# Patient Record
(demographics unavailable — no encounter records)

---

## 2024-10-08 NOTE — HISTORY OF PRESENT ILLNESS
[de-identified] : 10/8/24: 59 year old male presents for f/u for sinusitis Hx Covid 3 weeks ago Sore throat, anterior rhinorrhea, sinus pressure headache, Chronic tinnitus ongoing-amitriptyline 50 Continuing w Doxy, Amox, Prednisone Amitriptyline and Ipratropium gives him best relief, here for rx renewal. Currently rx pneumonia doxy amox loratadine pred  4/4/24:  Rx for sinusitis w doxy for sinusitis rx pred chronic tinnitus doing well w amitriptyline hx septoplasty 2011 pnd using ipratropium

## 2024-10-08 NOTE — PROCEDURE
[FreeTextEntry6] : Indication:  Unable to adequately examine nasal passages and sinus drainage with anterior rhinoscopy. The patient has hx sinusitis septoplasty  Scope # 99 Mild septal deviation is present on direct visualization on either side. Both inferior nasal turbinates are moderate in size with normal  appearing mucosa.  The sinus endoscope was introduced into the right nares exam right middle meatus reveals no mucopus, polyps or inflammation.  The middle turbinate is unremarkable. The scope was advanced and the sphenoethmoid region was inspected. The superior meatus and nasal vault are unremarkable.  The nasopharynx is unremarkable without inflammation or mass The sinus endoscope was introduced into the left nares exam of the left middle meatus reveals no mucopus, polyps or inflammation and the left middle turbinate is unremarkable. The scope was advanced and the sphenoethmoid region was inspected. The left superior meatus and nasal vault are unremarkable.

## 2024-10-08 NOTE — ASSESSMENT
[FreeTextEntry1] : reviewed ct and audio chronic loud tinnitus-continue amitriptyline ipratropium spray

## 2024-11-27 NOTE — HISTORY OF PRESENT ILLNESS
[de-identified] : The patient is a 59-year-old gentleman who presents for a follow up appointment and percentage of loss for his left and right hip. He was originally injured at work on January 24, 2020. The patient states on this date he was working for the [employer]. He was climbing a ladder to clean some speakers. Unfortunately the ladder collapsed on him falling 6 feet injuring his right hip, right hand, right wrist, his right elbow, lower back and cervical spine. He did follow-up with orthopedic practitioner Dr. Umanzor and his PA Chiki whereby a work-up revealed arthritic changes to the right hip. Since his injury on January 24, 2020, the patient was having difficulty standing for long periods of time, walking for long periods of time, negotiating stairs. He was having difficulty putting his shoes and socks on and getting of a car. The pain in the right hip is gotten significantly worse as the months have gone by. The patient had another injury on August 7, 2020 whereby working as a  in and around the [employer] department he had a twisting injury injuring his left hip. The patient had injections of cortisone into that left hip as well as exercise programs and anti-inflammatories until eventual left anterior total hip replacement that was done on December 29, 2021. He also had a Right total hip replaced after there was no relief with conservative management. Patient is currently working.  He states that he returned to work on a limited duties.  With working at a desk job at the present time.  In regards to both hips.  He states that the pain has markedly improved and is very happy with the progression following surgery.  He states that although he does have some mild discomfort at times overall he is pleased with his outcome.  And is slowly returned back to his activities.  He presents today for a percentage of loss for both the left and right hip.  He denies any new or recent injury.  There really is

## 2024-11-27 NOTE — PHYSICAL EXAM
[Antalgic] : antalgic [LE] : Sensory: Intact in bilateral lower extremities [ALL] : dorsalis pedis, posterior tibial, femoral, popliteal, and radial 2+ and symmetric bilaterally [de-identified] :  on physical examination of the left hip.  Patient is status post left total hip replacement from the anterior approach.  There is a well-healed surgical scar noted.  There is no redness, swelling, heat, discharge, ecchymosis, pain or tenderness on examination.  The patient has excellent range of motion, passively, actively and against resistance, in all planes.  There is no evidence of limb length discrepancy.  No palpable defect noted. No evidence of muscle atrophy. No evidence of any motor or sensory deficit and is neurovascularly intact.  Patient has good distal pulses with no calf tenderness.  On physical examination of the right hip.  Patient is status post right total hip replacement.  There is a well-healed surgical scar noted anteriorly.  There is no redness, swelling, heat, ecchymosis, discharge, pain or tenderness on examination.  The patient has excellent range of motion, passively, actively and against resistance, in all planes.  There is no evidence of limb length discrepancy.  No evidence of muscle atrophy. No palpable defect. No evidence of any motor or sensory deficit.  Patient has good distal pulses with no calf tenderness.  [de-identified] : X-rays of the left hip were taken in the office today. This includes 2 views. The AP pelvis and lateral hip. They reveal a status post left total hip replacement. The hardware is in excellent placement with great alignment, as well as fixation. There is no evidence of limb length discrepancies. There is no evidence of any fracture, dislocation, subsidence of the implant or loosening of the prosthesis.  X-rays of the right hip were taken in the office today. This includes 2 views. The AP pelvis and lateral hip. They reveal a status post left total hip replacement. The hardware is in perfect placement and shows excellent alignment as well as fixation. There is no evidence of limb length discrepancies. There is no evidence of any fracture, subsidence of the implant, dislocation or loosening of the prosthesis.   [Alert] : alert [Healthy Appearing] : healthy appearing [No Acute Distress] : no acute distress [Clear to Auscultation] : lungs were clear to auscultation bilaterally [Normal Rate] : normal rate [Regular Rhythm] : regular rhythm [Non-Tender] : Liver Edge: non-tender [Scleral Icterus] : no scleral icterus [Abdominal Bruit] : no abdominal bruit [Ascites Fluid Wave] : no ascites fluid wave [Ascites Tense] : no ascites tense [Clean] : unclean [Dry] : moist [Healing Well] : healing poorly [Jaundice] : no jaundice [Asterixis] : no asterixis [de-identified] : massively edematous [de-identified] : minimal fluid obese no anasarca [de-identified] : Tamar Snow incision well healed, obese [TextBox_40] : 1+ [FreeTextEntry1] : Need new labs massive fluid and weight increase, will start diuretics. RTC 1 week.

## 2024-11-27 NOTE — PHYSICAL EXAM
[Antalgic] : antalgic [LE] : Sensory: Intact in bilateral lower extremities [ALL] : dorsalis pedis, posterior tibial, femoral, popliteal, and radial 2+ and symmetric bilaterally [de-identified] :  on physical examination of the left hip.  Patient is status post left total hip replacement from the anterior approach.  There is a well-healed surgical scar noted.  There is no redness, swelling, heat, discharge, ecchymosis, pain or tenderness on examination.  The patient has excellent range of motion, passively, actively and against resistance, in all planes.  There is no evidence of limb length discrepancy.  No palpable defect noted. No evidence of muscle atrophy. No evidence of any motor or sensory deficit and is neurovascularly intact.  Patient has good distal pulses with no calf tenderness.  On physical examination of the right hip.  Patient is status post right total hip replacement.  There is a well-healed surgical scar noted anteriorly.  There is no redness, swelling, heat, ecchymosis, discharge, pain or tenderness on examination.  The patient has excellent range of motion, passively, actively and against resistance, in all planes.  There is no evidence of limb length discrepancy.  No evidence of muscle atrophy. No palpable defect. No evidence of any motor or sensory deficit.  Patient has good distal pulses with no calf tenderness.  [de-identified] : X-rays of the left hip were taken in the office today. This includes 2 views. The AP pelvis and lateral hip. They reveal a status post left total hip replacement. The hardware is in excellent placement with great alignment, as well as fixation. There is no evidence of limb length discrepancies. There is no evidence of any fracture, dislocation, subsidence of the implant or loosening of the prosthesis.  X-rays of the right hip were taken in the office today. This includes 2 views. The AP pelvis and lateral hip. They reveal a status post left total hip replacement. The hardware is in perfect placement and shows excellent alignment as well as fixation. There is no evidence of limb length discrepancies. There is no evidence of any fracture, subsidence of the implant, dislocation or loosening of the prosthesis.

## 2024-11-27 NOTE — REASON FOR VISIT
[Follow-Up Visit] : a follow-up visit for [Workers' Comp: Date of Injury: _____] : This visit is related to worker's compensation. Date of Injury: [unfilled] [FreeTextEntry2] : s/p Right THR 11/29/2023

## 2024-11-27 NOTE — HISTORY OF PRESENT ILLNESS
[de-identified] : The patient is a 59-year-old gentleman who presents for a follow up appointment and percentage of loss for his left and right hip. He was originally injured at work on January 24, 2020. The patient states on this date he was working for the [employer]. He was climbing a ladder to clean some speakers. Unfortunately the ladder collapsed on him falling 6 feet injuring his right hip, right hand, right wrist, his right elbow, lower back and cervical spine. He did follow-up with orthopedic practitioner Dr. Umanzor and his PA Chiki whereby a work-up revealed arthritic changes to the right hip. Since his injury on January 24, 2020, the patient was having difficulty standing for long periods of time, walking for long periods of time, negotiating stairs. He was having difficulty putting his shoes and socks on and getting of a car. The pain in the right hip is gotten significantly worse as the months have gone by. The patient had another injury on August 7, 2020 whereby working as a  in and around the [employer] department he had a twisting injury injuring his left hip. The patient had injections of cortisone into that left hip as well as exercise programs and anti-inflammatories until eventual left anterior total hip replacement that was done on December 29, 2021. He also had a Right total hip replaced after there was no relief with conservative management. Patient is currently working.  He states that he returned to work on a limited duties.  With working at a desk job at the present time.  In regards to both hips.  He states that the pain has markedly improved and is very happy with the progression following surgery.  He states that although he does have some mild discomfort at times overall he is pleased with his outcome.  And is slowly returned back to his activities.  He presents today for a percentage of loss for both the left and right hip.  He denies any new or recent injury.  There really is

## 2024-11-27 NOTE — DISCUSSION/SUMMARY
[de-identified] : The patient was assured that they are progressing well post operatively. They were explained that the prosthesis is in perfect alignment, perfect placement and fixated well.  It was explained that they are progressing well and as expected. It is recommended that the pt continue with the current treatment plan. This includes an ongoing exercise program, ice/moist heat when needed and NSAID's when needed. It was explained that a good exercise routine will help with pain relief and improve the overall longevity of the prosthesis. The patient is advised to return to the office in another year or so for further evaluation and x-rays. However, if there is any increase in pain, redness, swelling, heat, discharge, fever, change in ambulation or pain. The patient is strongly advised to call the office sooner.    In regards to his percentage of loss.  According to the [place] guidelines for percentage of loss.  The patient had a left total hip replacement as well as a right total hip replacement.  This would entail a 35% loss to the right as well as 35% loss to the left.  He does present with good range of motion in both hips with no evidence of limb length discrepancy or muscle atrophy.  Therefore no additional percentage will be added to his outcome.  Therefore it is determined so that Mr. Sam has a in total and 35% in total for the left hip and a 35% total of the right hip.  35 minutes were spent, face to face, in direct consultation with the patient. This includes reviewing the natural history of their Dx., eliciting the history, performing an orthopedic exam, review of the x-ray findings, forming a differential Dx and discussing all treatment options. This Includes both surgical and non-surgical treatments. I also reviewed all the risks and benefits of non-operative & operative Tx options, future impact into orthopedic functions/problems, activity restrictions both at home and at work, and all follow up requirements.

## 2024-11-27 NOTE — DISCUSSION/SUMMARY
[de-identified] : The patient was assured that they are progressing well post operatively. They were explained that the prosthesis is in perfect alignment, perfect placement and fixated well.  It was explained that they are progressing well and as expected. It is recommended that the pt continue with the current treatment plan. This includes an ongoing exercise program, ice/moist heat when needed and NSAID's when needed. It was explained that a good exercise routine will help with pain relief and improve the overall longevity of the prosthesis. The patient is advised to return to the office in another year or so for further evaluation and x-rays. However, if there is any increase in pain, redness, swelling, heat, discharge, fever, change in ambulation or pain. The patient is strongly advised to call the office sooner.    In regards to his percentage of loss.  According to the [place] guidelines for percentage of loss.  The patient had a left total hip replacement as well as a right total hip replacement.  This would entail a 35% loss to the right as well as 35% loss to the left.  He does present with good range of motion in both hips with no evidence of limb length discrepancy or muscle atrophy.  Therefore no additional percentage will be added to his outcome.  Therefore it is determined so that Mr. Sam has a in total and 35% in total for the left hip and a 35% total of the right hip.  35 minutes were spent, face to face, in direct consultation with the patient. This includes reviewing the natural history of their Dx., eliciting the history, performing an orthopedic exam, review of the x-ray findings, forming a differential Dx and discussing all treatment options. This Includes both surgical and non-surgical treatments. I also reviewed all the risks and benefits of non-operative & operative Tx options, future impact into orthopedic functions/problems, activity restrictions both at home and at work, and all follow up requirements.

## 2024-12-20 NOTE — PROCEDURE
[de-identified] : Indication:  Unable to adequately examine nasal passages and sinus drainage with anterior rhinoscopy. The patient has excessive pnd  Scope # 44 Mild septal deviation is present on direct visualization on either side. Both inferior nasal turbinates are moderate in size with normal  appearing mucosa.  The sinus endoscope was introduced into the right nares exam right middle meatus reveals no mucopus, polyps or inflammation.  The middle turbinate is unremarkable. The scope was advanced and the sphenoethmoid region was inspected. The superior meatus and nasal vault are unremarkable.  The nasopharynx is unremarkable without inflammation or mass The sinus endoscope was introduced into the left nares exam of the left middle meatus reveals no mucopus, polyps or inflammation and the left middle turbinate is unremarkable. The scope was advanced and the sphenoethmoid region was inspected. The left superior meatus and nasal vault are unremarkable. The fiberoptic scope was introduced to the posterior pharynx and exam of the endolarynx is wnl w good vocal cord mobility. No lesion noted in hypopharynx.  There is no erythema or edema of the posterior larynx to suggest reflux.mild vc sweling mild raspy voice

## 2024-12-20 NOTE — HISTORY OF PRESENT ILLNESS
[de-identified] : 12/20/24- 59 year old male presents for f/u sinusitis Completed Amitriptyline for tinnitus also receiving lidocane injections for tinnitus at [facility] Had lung and throat infection 1 week ago Currently on Doxy and Prednisone Co of excessive PND- light/dark green Also sinus pressure headache Followed with Pulmonologist Ipratropium spray daily Ears with "fluid" sensation   10/8/24: 59 year old male presents for f/u for sinusitis Hx Covid 3 weeks ago Sore throat, anterior rhinorrhea, sinus pressure headache, Chronic tinnitus ongoing-amitriptyline 50 Continuing w Doxy, Amox, Prednisone Amitriptyline and Ipratropium gives him best relief, here for rx renewal. Currently rx pneumonia doxy amox loratadine pred

## 2024-12-20 NOTE — ASSESSMENT
[FreeTextEntry1] : uri sinusitis  mild hyperplastic laryngitis continue doxy and pred taper ipratropium

## 2025-06-19 NOTE — REVIEW OF SYSTEMS
[Ear Noises] : ear noises [Ear Drainage] : ear drainage [Patient Intake Form Reviewed] : Patient intake form was reviewed [Negative] : Ear [de-identified] : pt states need medication refill gabapentin  [FreeTextEntry1] : pt had prostate surgery 6/4/25

## 2025-06-19 NOTE — DATA REVIEWED
[de-identified] :  Type A tymps, AU. Testing via inserts: Normal hearing 250 - 8000Hz, AU. Recs: 1) F/u w/ MD 2) Further tests as per MD  3) HAE for tinnitus management pending med clearance

## 2025-06-19 NOTE — ASSESSMENT
[FreeTextEntry1] : exam unremarkable audio mild loss au tinnitus renew amitriptyline 50 gabapentin 300